# Patient Record
(demographics unavailable — no encounter records)

---

## 2024-11-19 NOTE — REASON FOR VISIT
[Subsequent Evaluation] : a subsequent evaluation for [FreeTextEntry2] : s/p Revision left stapes surgery

## 2024-11-19 NOTE — HISTORY OF PRESENT ILLNESS
[de-identified] :  MAUREEN AVILES has a history of otosclerosis. Hearing loss first identified in or about 2019. Surgery in 2022 right ear surgery. Vertigo and severe tinnitus in the ear after surgery. Vertigo lasted for 2 months; and has resolved. Left facial, tongue numbness, Treated with steroids. Hearing was worse after surgery. Tinnitus has improved, but still present. Tried to use amplification but not satisfied due to pain. Family history of otosclerosis (mother)  [FreeTextEntry1] : 11/19/2024 History post operative visit. Reports no significant pain.  No significant tinnitus.  No significant vertigo.  No bleeding reported.  Physical Exam  Face: Normal Function bilaterally  Oral: Normal  Neck: No mass, Full range of motion  Hand: Incision intact, non inflamed   Microscopic Ear Exam Left Ear:  Ear canal packing removed gently with forceps.  No significant inflammation noted.  The tympanic membrane is intact.  Tuning Fork Testing 512 Hz: Flores test: referred to the Left Ear  Wound care instructions were reviewed with the patient in detail. Followup plans discussed.

## 2024-12-17 NOTE — REASON FOR VISIT
[Subsequent Evaluation] : a subsequent evaluation for [FreeTextEntry2] :  s/p Revision left stapes surgery.

## 2024-12-17 NOTE — HISTORY OF PRESENT ILLNESS
[de-identified] :  MAUREEN AVILES has a history of otosclerosis. Hearing loss first identified in or about 2019. Surgery in 2022 right ear surgery. Vertigo and severe tinnitus in the ear after surgery. Vertigo lasted for 2 months; and has resolved. Left facial, tongue numbness, Treated with steroids. Hearing was worse after surgery. Tinnitus has improved, but still present. Tried to use amplification but not satisfied due to pain. Family history of otosclerosis (mother)   [FreeTextEntry1] : 12/17/2024 History post operative visit. Reports no significant pain.  No significant tinnitus.  No significant vertigo. Hearing has improved   Physical Exam   Hand: Incision intact, non inflamed   Microscopic Ear Exam Left Ear:  Ear canal healing well.  The tympanic membrane is intact.   Complete audiometry was ordered and completed today. This was separately reported by the audiologist. The results were reviewed in detail with the patient.   Postoperative instructions reviewed with the patient in detail. Followup plans discussed.

## 2024-12-17 NOTE — ASSESSMENT
[FreeTextEntry1] : The patient reports improved hearing just after packing removal.  He later experienced a decline in hearing.  He now feels that his hearing is somewhat influenced by pressure changes and Valsalva.  He is not in any pain.  He has no vertigo.  His tinnitus is improved.  This may represent prosthesis displacement.  However I have encouraged him to simply observe for the full healing process.  Repeat audiometry in 2 to 3 months.  May consider CT imaging.

## 2025-04-03 NOTE — HISTORY OF PRESENT ILLNESS
[de-identified] :  MAUREEN AVILES has a history of otosclerosis. Hearing loss first identified in or about 2019. Surgery in 2022 right ear surgery. Vertigo and severe tinnitus in the ear after surgery.  Family history of otosclerosis (mother)   11/2024:  [FreeTextEntry1] :  04/03/2025 The patient reports persistent hearing loss in the left ear.  He did report improvement in hearing following his November surgery which lasted for several days.  He also feels that hearing improves at times when there is pressure changes due to altitude.  He has undergone recent CT and audiometry which I have reviewed with him in detail.

## 2025-04-03 NOTE — DATA REVIEWED
[de-identified] : Recent audiometry reviewed in detail [de-identified] : Recent CT imaging including images reviewed in detail

## 2025-04-03 NOTE — REASON FOR VISIT
[Home] : at home, [unfilled] , at the time of the visit. [Medical Office: ([facility])___] : at the medical office located in  [Telehealth (audio & video)] : This visit was provided via telehealth using real-time 2-way audio visual technology. [Subsequent Evaluation] : a subsequent evaluation for [Hearing Loss] : hearing loss

## 2025-04-03 NOTE — ASSESSMENT
[FreeTextEntry1] : The potential etiologies of his persistent conductive hearing loss in the left ear were reviewed in detail.  We carefully reviewed management options including the significant risk associated with revision surgery.  I have encouraged him to undergo a hearing aid evaluation.  If he continues to wish surgical reintervention, I have discussed this with him.  This may represent disruption of the incus attachment of the prosthesis.  Prosthesis length may also be an issue.  Replacement of the prosthesis may be successful but does have risk.  All risks limitations, complications and alternatives reviewed in detail.  Questions answered.  He will follow-up after his hearing aid trial.